# Patient Record
Sex: FEMALE | Race: WHITE | Employment: FULL TIME | URBAN - METROPOLITAN AREA
[De-identification: names, ages, dates, MRNs, and addresses within clinical notes are randomized per-mention and may not be internally consistent; named-entity substitution may affect disease eponyms.]

---

## 2020-05-05 ENCOUNTER — HOSPITAL ENCOUNTER (EMERGENCY)
Age: 20
Discharge: HOME | End: 2020-05-06
Attending: EMERGENCY MEDICINE
Payer: COMMERCIAL

## 2020-05-05 ENCOUNTER — APPOINTMENT (OUTPATIENT)
Dept: GENERAL RADIOLOGY | Age: 20
End: 2020-05-05
Attending: EMERGENCY MEDICINE
Payer: COMMERCIAL

## 2020-05-05 VITALS
HEART RATE: 93 BPM | TEMPERATURE: 98.1 F | DIASTOLIC BLOOD PRESSURE: 58 MMHG | HEIGHT: 65 IN | OXYGEN SATURATION: 98 % | RESPIRATION RATE: 16 BRPM | BODY MASS INDEX: 18.33 KG/M2 | SYSTOLIC BLOOD PRESSURE: 125 MMHG | WEIGHT: 110 LBS

## 2020-05-05 DIAGNOSIS — F12.10 MARIJUANA ABUSE: ICD-10-CM

## 2020-05-05 DIAGNOSIS — F10.920 ACUTE ALCOHOLIC INTOXICATION WITHOUT COMPLICATION (HCC): Primary | ICD-10-CM

## 2020-05-05 LAB
ALBUMIN SERPL-MCNC: 4 G/DL (ref 3.5–5)
ALBUMIN/GLOB SERPL: 1.1 {RATIO} (ref 1.1–2.2)
ALP SERPL-CCNC: 65 U/L (ref 45–117)
ALT SERPL-CCNC: 25 U/L (ref 12–78)
AMPHET UR QL SCN: NEGATIVE
ANION GAP SERPL CALC-SCNC: 11 MMOL/L (ref 5–15)
APPEARANCE UR: CLEAR
AST SERPL-CCNC: 16 U/L (ref 15–37)
BACTERIA URNS QL MICRO: NEGATIVE /HPF
BARBITURATES UR QL SCN: NEGATIVE
BASOPHILS # BLD: 0.1 K/UL (ref 0–0.1)
BASOPHILS NFR BLD: 1 % (ref 0–1)
BENZODIAZ UR QL: POSITIVE
BILIRUB SERPL-MCNC: 0.4 MG/DL (ref 0.2–1)
BILIRUB UR QL: NEGATIVE
BUN SERPL-MCNC: 6 MG/DL (ref 6–20)
BUN/CREAT SERPL: 9 (ref 12–20)
CALCIUM SERPL-MCNC: 8.8 MG/DL (ref 8.5–10.1)
CANNABINOIDS UR QL SCN: POSITIVE
CHLORIDE SERPL-SCNC: 105 MMOL/L (ref 97–108)
CO2 SERPL-SCNC: 21 MMOL/L (ref 21–32)
COCAINE UR QL SCN: NEGATIVE
COLOR UR: ABNORMAL
COMMENT, HOLDF: NORMAL
CREAT SERPL-MCNC: 0.7 MG/DL (ref 0.55–1.02)
DIFFERENTIAL METHOD BLD: ABNORMAL
DRUG SCRN COMMENT,DRGCM: ABNORMAL
EOSINOPHIL # BLD: 0 K/UL (ref 0–0.4)
EOSINOPHIL NFR BLD: 0 % (ref 0–7)
EPITH CASTS URNS QL MICRO: ABNORMAL /LPF
ERYTHROCYTE [DISTWIDTH] IN BLOOD BY AUTOMATED COUNT: 11.9 % (ref 11.5–14.5)
ETHANOL SERPL-MCNC: 209 MG/DL
GLOBULIN SER CALC-MCNC: 3.8 G/DL (ref 2–4)
GLUCOSE SERPL-MCNC: 89 MG/DL (ref 65–100)
GLUCOSE UR STRIP.AUTO-MCNC: NEGATIVE MG/DL
HCT VFR BLD AUTO: 45 % (ref 35–47)
HGB BLD-MCNC: 15.2 G/DL (ref 11.5–16)
HGB UR QL STRIP: ABNORMAL
IMM GRANULOCYTES # BLD AUTO: 0 K/UL (ref 0–0.04)
IMM GRANULOCYTES NFR BLD AUTO: 0 % (ref 0–0.5)
KETONES UR QL STRIP.AUTO: NEGATIVE MG/DL
LEUKOCYTE ESTERASE UR QL STRIP.AUTO: NEGATIVE
LYMPHOCYTES # BLD: 2.4 K/UL (ref 0.8–3.5)
LYMPHOCYTES NFR BLD: 34 % (ref 12–49)
MCH RBC QN AUTO: 30 PG (ref 26–34)
MCHC RBC AUTO-ENTMCNC: 33.8 G/DL (ref 30–36.5)
MCV RBC AUTO: 88.8 FL (ref 80–99)
METHADONE UR QL: NEGATIVE
MONOCYTES # BLD: 0.3 K/UL (ref 0–1)
MONOCYTES NFR BLD: 4 % (ref 5–13)
NEUTS SEG # BLD: 4.2 K/UL (ref 1.8–8)
NEUTS SEG NFR BLD: 61 % (ref 32–75)
NITRITE UR QL STRIP.AUTO: NEGATIVE
NRBC # BLD: 0 K/UL (ref 0–0.01)
NRBC BLD-RTO: 0 PER 100 WBC
OPIATES UR QL: NEGATIVE
PCP UR QL: NEGATIVE
PH UR STRIP: 6 [PH] (ref 5–8)
PLATELET # BLD AUTO: 331 K/UL (ref 150–400)
PMV BLD AUTO: 9.5 FL (ref 8.9–12.9)
POTASSIUM SERPL-SCNC: 3.6 MMOL/L (ref 3.5–5.1)
PROT SERPL-MCNC: 7.8 G/DL (ref 6.4–8.2)
PROT UR STRIP-MCNC: NEGATIVE MG/DL
RBC # BLD AUTO: 5.07 M/UL (ref 3.8–5.2)
RBC #/AREA URNS HPF: ABNORMAL /HPF (ref 0–5)
SAMPLES BEING HELD,HOLD: NORMAL
SODIUM SERPL-SCNC: 137 MMOL/L (ref 136–145)
SP GR UR REFRACTOMETRY: <1.005 (ref 1–1.03)
UA: UC IF INDICATED,UAUC: ABNORMAL
UROBILINOGEN UR QL STRIP.AUTO: 1 EU/DL (ref 0.2–1)
WBC # BLD AUTO: 7 K/UL (ref 3.6–11)
WBC URNS QL MICRO: ABNORMAL /HPF (ref 0–4)

## 2020-05-05 PROCEDURE — 80307 DRUG TEST PRSMV CHEM ANLYZR: CPT

## 2020-05-05 PROCEDURE — 85025 COMPLETE CBC W/AUTO DIFF WBC: CPT

## 2020-05-05 PROCEDURE — 99282 EMERGENCY DEPT VISIT SF MDM: CPT

## 2020-05-05 PROCEDURE — 81001 URINALYSIS AUTO W/SCOPE: CPT

## 2020-05-05 PROCEDURE — 80053 COMPREHEN METABOLIC PANEL: CPT

## 2020-05-05 RX ORDER — ONDANSETRON 2 MG/ML
8 INJECTION INTRAMUSCULAR; INTRAVENOUS
Status: DISCONTINUED | OUTPATIENT
Start: 2020-05-05 | End: 2020-05-06 | Stop reason: HOSPADM

## 2020-05-06 RX ORDER — ONDANSETRON 8 MG/1
8 TABLET, ORALLY DISINTEGRATING ORAL
Qty: 15 TAB | Refills: 0 | Status: SHIPPED | OUTPATIENT
Start: 2020-05-06

## 2020-05-06 NOTE — DISCHARGE INSTRUCTIONS
Patient Education        Acute Alcohol Intoxication: Care Instructions  Your Care Instructions    You have had treatment to help your body rid itself of alcohol. Too much alcohol upsets the body's fluid balance. Your doctor may have given you fluids and vitamins. For some people, drinking too much alcohol is a one-time event. For others, it is an ongoing problem. In either case, it is serious. It can be life-threatening. Follow-up care is a key part of your treatment and safety. Be sure to make and go to all appointments, and call your doctor if you are having problems. It's also a good idea to know your test results and keep a list of the medicines you take. How can you care for yourself at home? · Do not drink and drive. · Be safe with medicines. Take your medicines exactly as prescribed. Call your doctor if you think you are having a problem with your medicine. · Your doctor may have prescribed disulfiram (Antabuse). Do not drink any alcohol while you are taking this medicine. You may have severe or even life-threatening side effects from even small amounts of alcohol. · If you were given medicine to prevent nausea, be sure to take it exactly as prescribed. · Before you take any medicine, tell your doctor if:  ? You have had a bad reaction to any medicines in the past.  ? You are taking other medicines, including over-the-counter ones, or have other health problems. ? You are or could be pregnant. · Be prepared to have some symptoms of withdrawal in the next few days. · Drink plenty of liquids in the next few days. · Seek help if you need it to stop drinking. Getting counseling and joining a support group can help you stay sober. Try a support group such as Alcoholics Anonymous. · Avoid alcohol when you take medicines. It can react with many medicines and cause serious problems. When should you call for help? Call 911 anytime you think you may need emergency care.  For example, call if:    · You feel confused and are seeing things that are not there.     · You are thinking about killing yourself or hurting others.     · You have a seizure.     · You vomit blood or what looks like coffee grounds.    Call your doctor now or seek immediate medical care if:    · You have trembling, restlessness, sweating, and other withdrawal symptoms that are new or that get worse.     · Your withdrawal symptoms come back after not bothering you for days or weeks.     · You can't stop vomiting.    Watch closely for changes in your health, and be sure to contact your doctor if:    · You need help to stop drinking. Where can you learn more? Go to http://fareed-imelda.info/  Enter T102 in the search box to learn more about \"Acute Alcohol Intoxication: Care Instructions. \"  Current as of: August 21, 2019Content Version: 12.4  © 5119-5244 [employer], [employer]orporated. Care instructions adapted under license by [employer] (which disclaims liability or warranty for this information). If you have questions about a medical condition or this instruction, always ask your healthcare professional. [address] any warranty or liability for your use of this information.

## 2020-05-06 NOTE — ED PROVIDER NOTES
The patient is a 27-year-old female with a past medical history significant for anxiety and depression who presents to the ED with her mother for evaluation for altered mental status the patient. Has been taking Lexapro and Klonopin for her mental health disorders however, this evening, she drank alcohol and became increasingly slow and lethargic, stumbling around. Mother states that she perhaps was having an allergic reaction. The patient has a vague recollection of the event and appears slightly confused but intoxicated with a small shell of alcohol emanating from her breath. She denies any further discomfort at this time. No past medical history on file. No past surgical history on file. No family history on file.     Social History     Socioeconomic History    Marital status: SINGLE     Spouse name: Not on file    Number of children: Not on file    Years of education: Not on file    Highest education level: Not on file   Occupational History    Not on file   Social Needs    Financial resource strain: Not on file    Food insecurity     Worry: Not on file     Inability: Not on file    Transportation needs     Medical: Not on file     Non-medical: Not on file   Tobacco Use    Smoking status: Not on file   Substance and Sexual Activity    Alcohol use: Not on file    Drug use: Not on file    Sexual activity: Not on file   Lifestyle    Physical activity     Days per week: Not on file     Minutes per session: Not on file    Stress: Not on file   Relationships    Social connections     Talks on phone: Not on file     Gets together: Not on file     Attends Hoahaoism service: Not on file     Active member of club or organization: Not on file     Attends meetings of clubs or organizations: Not on file     Relationship status: Not on file    Intimate partner violence     Fear of current or ex partner: Not on file     Emotionally abused: Not on file     Physically abused: Not on file Forced sexual activity: Not on file   Other Topics Concern    Not on file   Social History Narrative    Not on file         ALLERGIES: Pcn [penicillins]    Review of Systems   Unable to perform ROS: Acuity of condition   All other systems reviewed and are negative. Vitals:    05/05/20 2150   BP: 125/58   Pulse: 93   Resp: 16   Temp: 98.1 °F (36.7 °C)   SpO2: 98%   Weight: 49.9 kg (110 lb)   Height: 5' 5\" (1.651 m)            Physical Exam  Vitals signs and nursing note reviewed. Exam conducted with a chaperone present. CONSTITUTIONAL: Well-appearing; well-nourished; in no apparent distress  HEAD: Normocephalic; atraumatic  EYES: PERRL; EOM intact; conjunctiva and sclera are clear bilaterally. ENT: No rhinorrhea; normal pharynx with no tonsillar hypertrophy; mucous membranes pink/moist, no erythema, no exudate. NECK: Supple; non-tender; no cervical lymphadenopathy  CARD: Normal S1, S2; no murmurs, rubs, or gallops. Regular rate and rhythm. RESP: Normal respiratory effort; breath sounds clear and equal bilaterally; no wheezes, rhonchi, or rales. ABD: Normal bowel sounds; non-distended; non-tender; no palpable organomegaly, no masses, no bruits. Back Exam: Normal inspection; no vertebral point tenderness, no CVA tenderness. Normal range of motion. EXT: Normal ROM in all four extremities; non-tender to palpation; no swelling or deformity; distal pulses are normal, no edema. SKIN: Warm; dry; no rash. NEURO:Alert and oriented x 3, coherent, RHODA-XII grossly intact, sensory and motor are non-focal.      MDM  Number of Diagnoses or Management Options  Diagnosis management comments: Assessment: Differential diagnosis include acute alcohol intoxication/rule out electrolyte abnormality, dehydration and substance abuse    Plan: Lab/IV fluid/antiemetic and analgesia/p.o. challenge/serial exam6/ Monitor and Reevaluate.          Amount and/or Complexity of Data Reviewed  Clinical lab tests: ordered and reviewed  Tests in the radiology section of CPT®: ordered and reviewed  Tests in the medicine section of CPT®: reviewed and ordered  Discussion of test results with the performing providers: yes  Decide to obtain previous medical records or to obtain history from someone other than the patient: yes  Obtain history from someone other than the patient: yes  Review and summarize past medical records: yes  Discuss the patient with other providers: yes  Independent visualization of images, tracings, or specimens: yes    Risk of Complications, Morbidity, and/or Mortality  Presenting problems: moderate  Diagnostic procedures: moderate  Management options: moderate           Procedures      Progress Note:   Pt has been reexamined by Leslie Salamanca MD. Pt is feeling much better. Symptoms have improved. All available results have been reviewed with pt and any available family. The patient was given p.o. challenge that she tolerated well. She denies any further discomfort. Patient's mother was contacted and discharge instruction were reviewed with her and the patient. pt understands sx, dx, and tx in ED. Care plan has been outlined and questions have been answered. Pt is ready to go home. Will send home on marijuana abuse and acute alcohol intoxication instruction. Prescription of Zofran. Oral rehydration instruction. . Outpatient referral with PCP as needed. Written by Leslie Salamanca MD,12:20 AM    .   .

## 2020-05-06 NOTE — ED TRIAGE NOTES
Triage: Per mother pt has started new prescription for xanax and also consumed ETOH this evening. Per pt, 3 drinks were consumed. Pt denies any suicidal ideation.

## 2020-06-29 ENCOUNTER — OFFICE VISIT (OUTPATIENT)
Dept: PRIMARY CARE CLINIC | Age: 20
End: 2020-06-29

## 2020-06-29 DIAGNOSIS — R19.7 DIARRHEA, UNSPECIFIED TYPE: Primary | ICD-10-CM

## 2020-06-29 NOTE — PROGRESS NOTES
Patient was seen in a Flu Clinic at [facility], [place], operated by [facility]. The patient verbally consented to being treated and billed for this visit    **  She is a 23 y.o. female who presents for evalution. Reviewed PmHx, RxHx, FmHx, SocHx, AllgHx and updated and dated in the chart. There are no active problems to display for this patient. Review of Systems - negative except as listed above in the HPI    Objective: There were no vitals filed for this visit. Physical Examination: General appearance - alert, well appearing, and in no distress    Assessment/ Plan:   Diagnoses and all orders for this visit:    1. Diarrhea, unspecified type  -     NOVEL CORONAVIRUS (COVID-19); Future  -see scanned sheet  -rec [facility] otc         Patient is presumed covid-19 positive based on criteria and testing performed and given mask today. See scanned note for history, vital details and testing results  There is no evidence of respiratory compromise  Discussed with patient to isolate for 14 days and gave [facility] handout on proper isolation   Gave work note if applicable  Discussed with patient to call 911 or go to ED with any inc respiratory sxs        I have discussed the diagnosis with the patient and the intended plan as seen in the above orders. The patient understands and agrees with the plan. The patient has received an after-visit summary and questions were answered concerning future plans. Medication Side Effects and Warnings were discussed with patient  Patient Labs were reviewed and or requested:  Patient Past Records were reviewed and or requested    Rodriguez Courtney M.D. There are no Patient Instructions on file for this visit.

## 2020-07-03 LAB — SARS-COV-2, NAA: NOT DETECTED

## 2020-11-11 NOTE — PROGRESS NOTES
HISTORY OF PRESENTING ILLNESS      Jenny Marin is a 23 y.o. female with lightheadedness upon standing who experienced lower extremity edema bilaterally several months ago that required compression socks. She has occasional palpitations. One episode of syncope in the past while walking. No family Hx of SCD. EKG shows SR with possible short [place] interval. She has been on her birth control for years without change in regimen; she started lexapro about 6 months ago however after her clinical symptoms reported above. PAST MEDICAL HISTORY     History reviewed. No pertinent past medical history. PAST SURGICAL HISTORY     History reviewed. No pertinent surgical history.        ALLERGIES     Allergies   Allergen Reactions    Pcn [Penicillins] Hives          FAMILY HISTORY     Family History   Problem Relation Age of Onset    Ulcerative Colitis Mother     Anemia Mother     Hypertension Father     Thyroid Disease Father     Hypertension Paternal Grandmother     Dementia Paternal Grandmother     Colon Cancer Paternal Grandfather     negative for cardiac disease       SOCIAL HISTORY     Social History     Socioeconomic History    Marital status: UNKNOWN     Spouse name: Not on file    Number of children: Not on file    Years of education: Not on file    Highest education level: Not on file   Tobacco Use    Smoking status: Current Every Day Smoker     Packs/day: 0.25     Years: 0.50     Pack years: 0.12    Smokeless tobacco: Never Used   Substance and Sexual Activity    Alcohol use: Yes     Comment: occ    Drug use: Not Currently     Types: Marijuana         MEDICATIONS     Current Outpatient Medications   Medication Sig    escitalopram oxalate (LEXAPRO) 20 mg tablet     norethindrone-ethinyl estradiol (Junel FE 1/20, 28,) 1 mg-20 mcg (21)/75 mg (7) tab Junel FE 1/20 (28) 1 mg-20 mcg (21)/75 mg (7) tablet    ondansetron (Zofran ODT) 8 mg disintegrating tablet Take 1 Tab by mouth every eight (8) hours as needed for Nausea or Vomiting. No current facility-administered medications for this visit. I have reviewed the nurses notes, vitals, problem list, allergy list, medical history, family, social history and medications. REVIEW OF SYMPTOMS      General: Pt denies excessive weight gain or loss. Pt is able to conduct ADL's  HEENT: Denies blurred vision, headaches, hearing loss, epistaxis and difficulty swallowing. Respiratory: Denies cough, congestion, shortness of breath, SRIVASTAVA, wheezing or stridor. Cardiovascular: Denies precordial pain, palpitations, edema or PND  Gastrointestinal: Denies poor appetite, indigestion, abdominal pain or blood in stool  Genitourinary: Denies hematuria, dysuria, increased urinary frequency  Musculoskeletal: Denies joint pain or swelling from muscles or joints  Neurologic: Denies tremor, paresthesias, headache, or sensory motor disturbance  Psychiatric: Denies confusion, insomnia, depression  Integumentray: Denies rash, itching or ulcers. Hematologic: Denies easy bruising, bleeding       PHYSICAL EXAMINATION      Vitals: see vitals section  General: Well developed, in no acute distress. HEENT: No jaundice, oral mucosa moist, no oral ulcers  Neck: Supple, no stiffness, no lymphadenopathy, supple  Heart:  Normal S1/S2 negative S3 or S4. Regular, no murmur, gallop or rub, no jugular venous distention  Respiratory: Clear bilaterally x 4, no wheezing or rales  Abdomen:   Soft, non-tender, bowel sounds are active. Extremities:  No edema, normal cap refill, no cyanosis. Musculoskeletal: No clubbing, no deformities  Neuro: A&Ox3, speech clear, gait stable, cooperative, no focal neurologic deficits  Skin: Skin color is normal. No rashes or lesions.  Non diaphoretic, moist.  Vascular: 2+ pulses symmetric in all extremities       DIAGNOSTIC DATA      EKG:        LABORATORY DATA      Lab Results   Component Value Date/Time    WBC 7.0 05/05/2020 09:56 PM    HGB 15.2 05/05/2020 09:56 PM    HCT 45.0 05/05/2020 09:56 PM    PLATELET 364 15/57/4933 09:56 PM    MCV 88.8 05/05/2020 09:56 PM      Lab Results   Component Value Date/Time    Sodium 137 05/05/2020 09:56 PM    Potassium 3.6 05/05/2020 09:56 PM    Chloride 105 05/05/2020 09:56 PM    CO2 21 05/05/2020 09:56 PM    Anion gap 11 05/05/2020 09:56 PM    Glucose 89 05/05/2020 09:56 PM    BUN 6 05/05/2020 09:56 PM    Creatinine 0.70 05/05/2020 09:56 PM    BUN/Creatinine ratio 9 (L) 05/05/2020 09:56 PM    GFR est AA >60 05/05/2020 09:56 PM    GFR est non-AA >60 05/05/2020 09:56 PM    Calcium 8.8 05/05/2020 09:56 PM    Bilirubin, total 0.4 05/05/2020 09:56 PM    Alk. phosphatase 65 05/05/2020 09:56 PM    Protein, total 7.8 05/05/2020 09:56 PM    Albumin 4.0 05/05/2020 09:56 PM    Globulin 3.8 05/05/2020 09:56 PM    A-G Ratio 1.1 05/05/2020 09:56 PM    ALT (SGPT) 25 05/05/2020 09:56 PM           ASSESSMENT      1. Lightheadedness  2. Palpitations  3. Edema       PLAN     Plan for echocardiogram, 30 day monitor, TSH, lower extremity ultrasound to evaluate for venous insufficiency. ICD-10-CM ICD-9-CM    1. Lightheadedness  R42 780.4 AMB POC EKG ROUTINE W/ 12 LEADS, INTER & REP      TSH 3RD GENERATION   2. Palpitations  R00.2 785.1      Orders Placed This Encounter    TSH 3RD GENERATION     Standing Status:   Future     Standing Expiration Date:   11/16/2021    AMB POC EKG ROUTINE W/ 12 LEADS, INTER & REP     Order Specific Question:   Reason for Exam:     Answer:   Postural orthostatic tachycardia syndrome, lightheaded    escitalopram oxalate (LEXAPRO) 20 mg tablet    norethindrone-ethinyl estradiol (Junel FE 1/20, 28,) 1 mg-20 mcg (21)/75 mg (7) tab     Sig: Junel FE 1/20 (28) 1 mg-20 mcg (21)/75 mg (7) tablet          FOLLOW-UP     After testing    Thank you, Cari Gonzalez MD for allowing me to participate in the care of this extraordinarily pleasant female.  Please do not hesitate to contact me for further questions/concerns.          Karmen Moreno MD  Cardiac Electrophysiology / Cardiology    [facility].  [address] [facility], [facility], [address], [address] [place] [address], [address] [place] [address]  [phone] / [phone] Fax   [phone] / [phone] Fax

## 2020-11-16 ENCOUNTER — OFFICE VISIT (OUTPATIENT)
Dept: CARDIOLOGY CLINIC | Age: 20
End: 2020-11-16
Payer: COMMERCIAL

## 2020-11-16 VITALS
BODY MASS INDEX: 18.46 KG/M2 | HEIGHT: 65 IN | HEART RATE: 68 BPM | OXYGEN SATURATION: 96 % | DIASTOLIC BLOOD PRESSURE: 72 MMHG | WEIGHT: 110.8 LBS | SYSTOLIC BLOOD PRESSURE: 108 MMHG

## 2020-11-16 DIAGNOSIS — R42 LIGHTHEADEDNESS: Primary | ICD-10-CM

## 2020-11-16 DIAGNOSIS — R00.2 PALPITATIONS: ICD-10-CM

## 2020-11-16 PROCEDURE — 99205 OFFICE O/P NEW HI 60 MIN: CPT | Performed by: INTERNAL MEDICINE

## 2020-11-16 PROCEDURE — 93000 ELECTROCARDIOGRAM COMPLETE: CPT | Performed by: INTERNAL MEDICINE

## 2020-11-16 RX ORDER — ESCITALOPRAM OXALATE 20 MG/1
20 TABLET ORAL DAILY
COMMUNITY
Start: 2020-11-12

## 2020-11-16 RX ORDER — NORETHINDRONE ACETATE AND ETHINYL ESTRADIOL 1MG-20(21)
KIT ORAL
COMMUNITY

## 2020-11-16 NOTE — PROGRESS NOTES
Room 4    Postural orthostatic tachycardia syndrome    Before covid started she was advised by Dr. Shelia Wolf she may have POTS    Has had swelling in legs x 6-7 months, has gone down since. In middle school, she would get lightheaded when standing. Now the lightheadedness is every once in a while.      Have you ever seen a electrophysiologist: First timer    Chest pain: no  Shortness of breath: no  Edema: no  Palpitations, Skipped beats, Rapid heartbeat: no  Dizziness: no    (Recent) New diagnosis/Surgeries: no    ER/Hospitalizations for your symptoms: no

## 2020-11-25 ENCOUNTER — ANCILLARY PROCEDURE (OUTPATIENT)
Dept: CARDIOLOGY CLINIC | Age: 20
End: 2020-11-25
Payer: COMMERCIAL

## 2020-11-25 VITALS
WEIGHT: 110 LBS | SYSTOLIC BLOOD PRESSURE: 106 MMHG | DIASTOLIC BLOOD PRESSURE: 72 MMHG | HEIGHT: 65 IN | BODY MASS INDEX: 18.33 KG/M2

## 2020-11-25 VITALS
BODY MASS INDEX: 18.33 KG/M2 | DIASTOLIC BLOOD PRESSURE: 72 MMHG | HEIGHT: 65 IN | SYSTOLIC BLOOD PRESSURE: 106 MMHG | WEIGHT: 110 LBS

## 2020-11-25 DIAGNOSIS — R00.2 PALPITATIONS: ICD-10-CM

## 2020-11-25 DIAGNOSIS — R60.0 BILATERAL LEG EDEMA: ICD-10-CM

## 2020-11-25 DIAGNOSIS — R42 LIGHTHEADED: ICD-10-CM

## 2020-11-25 DIAGNOSIS — R55 PRE-SYNCOPE: ICD-10-CM

## 2020-11-25 PROCEDURE — 93306 TTE W/DOPPLER COMPLETE: CPT | Performed by: SPECIALIST

## 2020-11-25 PROCEDURE — 93970 EXTREMITY STUDY: CPT | Performed by: INTERNAL MEDICINE

## 2020-11-29 LAB
LEFT CFV DIAM: 0.73 CM
LEFT CFV RFX: 1.8 S
LEFT GSV ANKLE DIAM: 0.32 CM
LEFT GSV AT KNEE DIAM: 0.29 CM
LEFT GSV BK MID DIAM: 0.35 CM
LEFT GSV BK PROX DIAM: 0.36 CM
LEFT GSV JUNC DIAM: 0.4 CM
LEFT GSV JUNC RFX: 1.3 S
LEFT GSV THIGH DIST DIAM: 0.38 CM
LEFT GSV THIGH MID DIAM: 0.37 CM
LEFT GSV THIGH PROX DIAM: 0.4 CM
Lab: 0.3 S
RIGHT GSV AK RFX: 0.4 S
RIGHT GSV BK MID RFX: 0.3 S
RIGHT GSV BK PROX RFX: 0.4 S
RIGHT GSV JUNC RFX: 0.6 S
RIGHT GSV THIGH DIST RFX: 0.4 S
RIGHT GSV THIGH MID RFX: 0.4 S
RIGHT GSV THIGH PROX RFX: 0.3 S

## 2020-12-02 ENCOUNTER — TELEPHONE (OUTPATIENT)
Dept: CARDIOLOGY CLINIC | Age: 20
End: 2020-12-02

## 2020-12-02 DIAGNOSIS — I87.2 VENOUS INSUFFICIENCY: Primary | ICD-10-CM

## 2020-12-02 LAB
ECHO AO ASC DIAM: 2.24 CM
ECHO AO ROOT DIAM: 2.41 CM
ECHO AV AREA PEAK VELOCITY: 2.21 CM2
ECHO AV AREA VTI: 1.9 CM2
ECHO AV AREA/BSA PEAK VELOCITY: 1.4 CM2/M2
ECHO AV AREA/BSA VTI: 1.2 CM2/M2
ECHO AV MEAN GRADIENT: 2.97 MMHG
ECHO AV PEAK GRADIENT: 5.3 MMHG
ECHO AV PEAK VELOCITY: 115.13 CM/S
ECHO AV VTI: 24.71 CM
ECHO LA AREA 4C: 12.11 CM2
ECHO LA MAJOR AXIS: 2.9 CM
ECHO LA MINOR AXIS: 1.89 CM
ECHO LA VOL 2C: 27.27 ML (ref 22–52)
ECHO LA VOL 4C: 26.01 ML (ref 22–52)
ECHO LA VOL BP: 30.28 ML (ref 22–52)
ECHO LA VOL/BSA BIPLANE: 19.74 ML/M2 (ref 16–28)
ECHO LA VOLUME INDEX A2C: 17.77 ML/M2 (ref 16–28)
ECHO LA VOLUME INDEX A4C: 16.95 ML/M2 (ref 16–28)
ECHO LV E' LATERAL VELOCITY: 18.02 CM/S
ECHO LV E' SEPTAL VELOCITY: 10.68 CM/S
ECHO LV EDV A2C: 74.74 ML
ECHO LV EDV A4C: 82.3 ML
ECHO LV EDV BP: 82.09 ML (ref 56–104)
ECHO LV EDV INDEX A4C: 53.6 ML/M2
ECHO LV EDV INDEX BP: 53.5 ML/M2
ECHO LV EDV NDEX A2C: 48.7 ML/M2
ECHO LV EJECTION FRACTION A2C: 62 PERCENT
ECHO LV EJECTION FRACTION A4C: 66 PERCENT
ECHO LV EJECTION FRACTION BIPLANE: 64.7 PERCENT (ref 55–100)
ECHO LV ESV A2C: 28.16 ML
ECHO LV ESV A4C: 28.27 ML
ECHO LV ESV BP: 29.02 ML (ref 19–49)
ECHO LV ESV INDEX A2C: 18.4 ML/M2
ECHO LV ESV INDEX A4C: 18.4 ML/M2
ECHO LV ESV INDEX BP: 18.9 ML/M2
ECHO LV GLOBAL LONGITUDINAL STRAIN (GLS): -22.4 PERCENT
ECHO LV INTERNAL DIMENSION DIASTOLIC: 4.37 CM (ref 3.9–5.3)
ECHO LV INTERNAL DIMENSION SYSTOLIC: 2.63 CM
ECHO LV IVSD: 0.61 CM (ref 0.6–0.9)
ECHO LV MASS 2D: 91.8 G (ref 67–162)
ECHO LV MASS INDEX 2D: 59.8 G/M2 (ref 43–95)
ECHO LV POSTERIOR WALL DIASTOLIC: 0.8 CM (ref 0.6–0.9)
ECHO LVOT DIAM: 1.85 CM
ECHO LVOT PEAK GRADIENT: 3.62 MMHG
ECHO LVOT PEAK VELOCITY: 95.18 CM/S
ECHO LVOT SV: 47 ML
ECHO LVOT VTI: 17.57 CM
ECHO MV A VELOCITY: 32.34 CM/S
ECHO MV E DECELERATION TIME (DT): 119.45 MS
ECHO MV E VELOCITY: 96.81 CM/S
ECHO MV E/A RATIO: 2.99
ECHO MV E/E' LATERAL: 5.37
ECHO MV E/E' RATIO (AVERAGED): 7.22
ECHO MV E/E' SEPTAL: 9.06
ECHO MV PRESSURE HALF TIME (PHT): 34.64 MS
ECHO RA AREA 4C: 7.73 CM2
ECHO RV INTERNAL DIMENSION: 2.31 CM
ECHO RV TAPSE: 1.75 CM (ref 1.5–2)
ECHO TV REGURGITANT MAX VELOCITY: 202.56 CM/S
ECHO TV REGURGITANT PEAK GRADIENT: 16.41 MMHG
GLOBAL LONGITUDINAL STRAIN 2 CHAMBER: -24.3 PERCENT
GLOBAL LONGITUDINAL STRAIN 4 CHAMBER: -20.3 PERCENT
GLOBAL LONGITUDINAL STRAIN LONG AXIS: -22.5 PERCENT
LA VOL DISK BP: 28.14 ML (ref 22–52)

## 2020-12-02 NOTE — TELEPHONE ENCOUNTER
----- Message from Huyen Owens NP sent at 12/2/2020  4:29 PM EST -----  Please let her know she has some venous insufficiency and we'd like to refer her to Dr. Sheri Sam for evaluation, thank you. Keep f/u appt with me. Called patient, no answer. VM not set up unable to leave a message.

## 2020-12-02 NOTE — PROGRESS NOTES
Please let her know she has some venous insufficiency and we'd like to refer her to Dr. Cy Weir for evaluation, thank you. Keep f/u appt with me.

## 2020-12-28 NOTE — PROGRESS NOTES
HISTORY OF PRESENTING ILLNESS      Jenny Marin is a 23 y.o. female with lightheadedness upon standing who experienced lower extremity edema bilaterally several months ago that required compression socks. She has occasional palpitations. One episode of syncope in the past while walking. No family Hx of SCD. EKG shows SR with possible short [place] interval. She has been on her birth control for years without change in regimen; she started lexapro about 6 months ago however after her clinical symptoms reported above. Echocardiogram showed preserved LV function with EF 60-65%. 30 day monitor showed normal sinus rhythm throughout. Her lower extremity ultrasound showed venous insufficiency. She wears compression stockings occasionally at work. Overall, she denies recurrence of symptoms since last visit. PAST MEDICAL HISTORY     History reviewed. No pertinent past medical history. PAST SURGICAL HISTORY     History reviewed. No pertinent surgical history.        ALLERGIES     Allergies   Allergen Reactions    Pcn [Penicillins] Hives          FAMILY HISTORY     Family History   Problem Relation Age of Onset    Ulcerative Colitis Mother     Anemia Mother     Hypertension Father     Thyroid Disease Father     Hypertension Paternal Grandmother     Dementia Paternal Grandmother     Colon Cancer Paternal Grandfather     negative for cardiac disease       SOCIAL HISTORY     Social History     Socioeconomic History    Marital status: UNKNOWN     Spouse name: Not on file    Number of children: Not on file    Years of education: Not on file    Highest education level: Not on file   Tobacco Use    Smoking status: Current Every Day Smoker     Packs/day: 0.25     Years: 0.50     Pack years: 0.12    Smokeless tobacco: Never Used   Substance and Sexual Activity    Alcohol use: Yes     Comment: occ    Drug use: Not Currently     Types: Marijuana         MEDICATIONS     Current Outpatient Medications   Medication Sig    escitalopram oxalate (LEXAPRO) 20 mg tablet     norethindrone-ethinyl estradiol (Junel FE 1/20, 28,) 1 mg-20 mcg (21)/75 mg (7) tab Junel FE 1/20 (28) 1 mg-20 mcg (21)/75 mg (7) tablet    ondansetron (Zofran ODT) 8 mg disintegrating tablet Take 1 Tab by mouth every eight (8) hours as needed for Nausea or Vomiting. No current facility-administered medications for this visit. I have reviewed the nurses notes, vitals, problem list, allergy list, medical history, family, social history and medications. REVIEW OF SYMPTOMS      General: Pt denies excessive weight gain or loss. Pt is able to conduct ADL's  HEENT: Denies blurred vision, headaches, hearing loss, epistaxis and difficulty swallowing. Respiratory: Denies cough, congestion, shortness of breath, SRIVASTAVA, wheezing or stridor. Cardiovascular: Denies precordial pain, palpitations, edema or PND  Gastrointestinal: Denies poor appetite, indigestion, abdominal pain or blood in stool  Genitourinary: Denies hematuria, dysuria, increased urinary frequency  Musculoskeletal: Denies joint pain or swelling from muscles or joints  Neurologic: Denies tremor, paresthesias, headache, or sensory motor disturbance  Psychiatric: Denies confusion, insomnia, depression  Integumentray: Denies rash, itching or ulcers. Hematologic: Denies easy bruising, bleeding       PHYSICAL EXAMINATION      Vitals: see vitals section  General: Well developed, in no acute distress. HEENT: No jaundice, oral mucosa moist, no oral ulcers  Neck: Supple, no stiffness, no lymphadenopathy, supple  Heart:  Normal S1/S2 negative S3 or S4. Regular, no murmur, gallop or rub, no jugular venous distention  Respiratory: Clear bilaterally x 4, no wheezing or rales  Abdomen:   Soft, non-tender, bowel sounds are active. Extremities:  No edema, normal cap refill, no cyanosis.   Musculoskeletal: No clubbing, no deformities  Neuro: A&Ox3, speech clear, gait stable, cooperative, no focal neurologic deficits  Skin: Skin color is normal. No rashes or lesions. Non diaphoretic, moist.  Vascular: 2+ pulses symmetric in all extremities       DIAGNOSTIC DATA      EKG:        LABORATORY DATA      Lab Results   Component Value Date/Time    WBC 7.0 05/05/2020 09:56 PM    HGB 15.2 05/05/2020 09:56 PM    HCT 45.0 05/05/2020 09:56 PM    PLATELET 129 57/34/1000 09:56 PM    MCV 88.8 05/05/2020 09:56 PM      Lab Results   Component Value Date/Time    Sodium 137 05/05/2020 09:56 PM    Potassium 3.6 05/05/2020 09:56 PM    Chloride 105 05/05/2020 09:56 PM    CO2 21 05/05/2020 09:56 PM    Anion gap 11 05/05/2020 09:56 PM    Glucose 89 05/05/2020 09:56 PM    BUN 6 05/05/2020 09:56 PM    Creatinine 0.70 05/05/2020 09:56 PM    BUN/Creatinine ratio 9 (L) 05/05/2020 09:56 PM    GFR est AA >60 05/05/2020 09:56 PM    GFR est non-AA >60 05/05/2020 09:56 PM    Calcium 8.8 05/05/2020 09:56 PM    Bilirubin, total 0.4 05/05/2020 09:56 PM    Alk. phosphatase 65 05/05/2020 09:56 PM    Protein, total 7.8 05/05/2020 09:56 PM    Albumin 4.0 05/05/2020 09:56 PM    Globulin 3.8 05/05/2020 09:56 PM    A-G Ratio 1.1 05/05/2020 09:56 PM    ALT (SGPT) 25 05/05/2020 09:56 PM           ASSESSMENT      1. Lightheadedness  2. Palpitations  3. Edema      PLAN     Encouraged increased use of compression stockings, lower extremity exercises. Continue to monitor for recurrence of symptoms, consider longer term monitoring for recurrent syncope/lightheadedness. Offered referral for venous insufficiency which she has declined at this time. ICD-10-CM ICD-9-CM    1. Palpitations  R00.2 785.1    2. Pre-syncope  R55 780.2    3. Bilateral leg edema  R60.0 782.3    4. Lightheadedness  R42 780.4      No orders of the defined types were placed in this encounter. FOLLOW-UP   1 year    Thank you, Irene Rivera MD for allowing me to participate in the care of this extraordinarily pleasant female.  Please do not hesitate to contact me for further questions/concerns.      L[place]man [place], NP    [facility].  [address] [facility], [facility], [address]    [facility]  [phone] / [phone] Fax   [phone] / [phone] Fax

## 2020-12-29 ENCOUNTER — OFFICE VISIT (OUTPATIENT)
Dept: CARDIOLOGY CLINIC | Age: 20
End: 2020-12-29
Payer: COMMERCIAL

## 2020-12-29 VITALS
SYSTOLIC BLOOD PRESSURE: 106 MMHG | DIASTOLIC BLOOD PRESSURE: 66 MMHG | HEART RATE: 80 BPM | WEIGHT: 115 LBS | HEIGHT: 65 IN | BODY MASS INDEX: 19.16 KG/M2 | OXYGEN SATURATION: 98 %

## 2020-12-29 DIAGNOSIS — R00.2 PALPITATIONS: Primary | ICD-10-CM

## 2020-12-29 DIAGNOSIS — R60.0 BILATERAL LEG EDEMA: ICD-10-CM

## 2020-12-29 DIAGNOSIS — R55 PRE-SYNCOPE: ICD-10-CM

## 2020-12-29 DIAGNOSIS — R42 LIGHTHEADEDNESS: ICD-10-CM

## 2020-12-29 PROCEDURE — 99215 OFFICE O/P EST HI 40 MIN: CPT | Performed by: NURSE PRACTITIONER

## 2020-12-29 NOTE — LETTER
12/29/2020 Patient: Shruthi Romano YOB: 2000 Date of Visit: 12/29/2020 Nataliya Humphrey MD 
[address] [facility] [address] [place] [address] [phone] Via Fax: [phone] Dear Nataliya Humphrey MD, Thank you for referring Ms. Jenny Marin to [facility] for evaluation. My notes for this consultation are attached. If you have questions, please do not hesitate to call me. I look forward to following your patient along with you. Sincerely, Chris Pulse, NP

## 2020-12-29 NOTE — PROGRESS NOTES
Room 4    Visit Vitals  /66   Pulse 80   Ht 5' 5\" (1.651 m)   Wt 115 lb (52.2 kg)   SpO2 98%   BMI 19.14 kg/m²       US, Echo and monitor (11-26 to 12-26) review and discuss all    Chest pain: no  Shortness of breath: no  Edema: no  Palpitations, Skipped beats, Rapid heartbeat: no  Dizziness: no    New diagnosis/Surgeries: no    ER/Hospitalizations: no    Refills: no

## 2020-12-29 NOTE — TELEPHONE ENCOUNTER
Future Appointments   Date Time Provider Anika Childers   12/29/2020 11:40 AM Enrique Parikh NP [facility]

## 2021-02-19 ENCOUNTER — TELEPHONE (OUTPATIENT)
Dept: CARDIOLOGY CLINIC | Age: 21
End: 2021-02-19

## 2021-02-19 NOTE — LETTER
2/19/2021 [employer] [address] [facility] [id number] Dear Ms. Marin, We received a referral from Dr. Felipe Singletary requesting that you be scheduled for an appointment with Dr. Dulce Cervantes. We have been unable to contact you via the telephone number provided in chart. Please call our office at [phone] to schedule an appointment. Sincerely, Clovis Roque RN

## 2021-02-19 NOTE — TELEPHONE ENCOUNTER
Have attempted to contact patient for appointment with Dr. Jacinto Osullivan. Mailbox not set up. Patient referred by Dr. Tahmina Layne NP. Letter mailed.

## 2022-01-10 ENCOUNTER — OFFICE VISIT (OUTPATIENT)
Dept: CARDIOLOGY CLINIC | Age: 22
End: 2022-01-10
Payer: COMMERCIAL

## 2022-01-10 VITALS
RESPIRATION RATE: 14 BRPM | HEART RATE: 64 BPM | DIASTOLIC BLOOD PRESSURE: 66 MMHG | SYSTOLIC BLOOD PRESSURE: 100 MMHG | BODY MASS INDEX: 19.53 KG/M2 | OXYGEN SATURATION: 96 % | WEIGHT: 117.2 LBS | HEIGHT: 65 IN

## 2022-01-10 DIAGNOSIS — R00.2 PALPITATIONS: Primary | ICD-10-CM

## 2022-01-10 DIAGNOSIS — R55 PRE-SYNCOPE: ICD-10-CM

## 2022-01-10 PROCEDURE — 99214 OFFICE O/P EST MOD 30 MIN: CPT | Performed by: NURSE PRACTITIONER

## 2022-01-10 PROCEDURE — 93000 ELECTROCARDIOGRAM COMPLETE: CPT | Performed by: NURSE PRACTITIONER

## 2022-01-10 NOTE — PROGRESS NOTES
HISTORY OF PRESENTING ILLNESS      Jenny Marin is a 19 y.o. female with lightheadedness upon standing who experienced lower extremity edema bilaterally several months ago that required compression socks. She has occasional palpitations. One episode of syncope in the past while walking. No family Hx of SCD. EKG shows SR with possible short NE interval. She has been on her birth control for years without change in regimen; she started lexapro about 6 months ago however after her clinical symptoms reported above. Echocardiogram showed preserved LV function with EF 60-65%. 30 day monitor showed normal sinus rhythm throughout. Her lower extremity ultrasound showed venous insufficiency. She wears compression stockings occasionally at work and last visit, encouraged increased use of compression stockings, lower extremity exercises. She has had episodes of palpitations after prolonged standing, usually on days she is reportedly less hydrated. PAST MEDICAL HISTORY     No past medical history on file. PAST SURGICAL HISTORY     No past surgical history on file.        ALLERGIES     Allergies   Allergen Reactions    Pcn [Penicillins] Hives          FAMILY HISTORY     Family History   Problem Relation Age of Onset    Ulcerative Colitis Mother     Anemia Mother     Hypertension Father     Thyroid Disease Father     Hypertension Paternal Grandmother     Dementia Paternal Grandmother     Colon Cancer Paternal Grandfather     negative for cardiac disease       SOCIAL HISTORY     Social History     Socioeconomic History    Marital status: UNKNOWN   Tobacco Use    Smoking status: Current Every Day Smoker     Packs/day: 0.25     Years: 0.50     Pack years: 0.12    Smokeless tobacco: Never Used   Substance and Sexual Activity    Alcohol use: Yes     Comment: occ    Drug use: Not Currently     Types: Marijuana         MEDICATIONS     Current Outpatient Medications   Medication Sig    escitalopram oxalate (LEXAPRO) 20 mg tablet     norethindrone-ethinyl estradiol (Junel FE 1/20, 28,) 1 mg-20 mcg (21)/75 mg (7) tab Junel FE 1/20 (28) 1 mg-20 mcg (21)/75 mg (7) tablet    ondansetron (Zofran ODT) 8 mg disintegrating tablet Take 1 Tab by mouth every eight (8) hours as needed for Nausea or Vomiting. No current facility-administered medications for this visit. I have reviewed the nurses notes, vitals, problem list, allergy list, medical history, family, social history and medications. REVIEW OF SYMPTOMS      General:+lightheadedness, Pt denies excessive weight gain or loss. Pt is able to conduct ADL's  HEENT: Denies blurred vision, headaches, hearing loss, epistaxis and difficulty swallowing. Respiratory: Denies cough, congestion, shortness of breath, SRIVASTAVA, wheezing or stridor. Cardiovascular: +palpitations, Denies precordial pain, edema or PND  Gastrointestinal: Denies poor appetite, indigestion, abdominal pain or blood in stool  Genitourinary: Denies hematuria, dysuria, increased urinary frequency  Musculoskeletal: Denies joint pain or swelling from muscles or joints  Neurologic: Denies tremor, paresthesias, headache, or sensory motor disturbance  Psychiatric: Denies confusion, insomnia, depression  Integumentray: Denies rash, itching or ulcers. Hematologic: Denies easy bruising, bleeding       PHYSICAL EXAMINATION      Vitals: see vitals section  General: Well developed, in no acute distress. HEENT: No jaundice, oral mucosa moist, no oral ulcers  Neck: Supple, no stiffness, no lymphadenopathy, supple  Heart:  Normal S1/S2 negative S3 or S4. Regular, no murmur, gallop or rub, no jugular venous distention  Respiratory: Clear bilaterally x 4, no wheezing or rales  Abdomen:   Soft, non-tender, bowel sounds are active. Extremities:  No edema, normal cap refill, no cyanosis.   Musculoskeletal: No clubbing, no deformities  Neuro: A&Ox3, speech clear, gait stable, cooperative, no focal neurologic deficits  Skin: Skin color is normal. No rashes or lesions. Non diaphoretic, moist.  Vascular: 2+ pulses symmetric in all extremities       DIAGNOSTIC DATA      EKG: sinus rhythm with short WI       LABORATORY DATA      Lab Results   Component Value Date/Time    WBC 7.0 05/05/2020 09:56 PM    HGB 15.2 05/05/2020 09:56 PM    HCT 45.0 05/05/2020 09:56 PM    PLATELET 506 28/38/8117 09:56 PM    MCV 88.8 05/05/2020 09:56 PM      Lab Results   Component Value Date/Time    Sodium 137 05/05/2020 09:56 PM    Potassium 3.6 05/05/2020 09:56 PM    Chloride 105 05/05/2020 09:56 PM    CO2 21 05/05/2020 09:56 PM    Anion gap 11 05/05/2020 09:56 PM    Glucose 89 05/05/2020 09:56 PM    BUN 6 05/05/2020 09:56 PM    Creatinine 0.70 05/05/2020 09:56 PM    BUN/Creatinine ratio 9 (L) 05/05/2020 09:56 PM    GFR est AA >60 05/05/2020 09:56 PM    GFR est non-AA >60 05/05/2020 09:56 PM    Calcium 8.8 05/05/2020 09:56 PM    Bilirubin, total 0.4 05/05/2020 09:56 PM    Alk. phosphatase 65 05/05/2020 09:56 PM    Protein, total 7.8 05/05/2020 09:56 PM    Albumin 4.0 05/05/2020 09:56 PM    Globulin 3.8 05/05/2020 09:56 PM    A-G Ratio 1.1 05/05/2020 09:56 PM    ALT (SGPT) 25 05/05/2020 09:56 PM           ASSESSMENT      1. Lightheadedness  2. Palpitations  3. Edema      PLAN     Continue to monitor for recurrence of symptoms, consider longer term monitoring for recurrent syncope/lightheadedness. Encouraged increased hydration for palpitations, continued compliance with compression socks for lightheadedness and edema. FOLLOW-UP   As needed    Thank you, Isha Soares MD for allowing me to participate in the care of this extraordinarily pleasant female. Please do not hesitate to contact me for further questions/concerns.      Carlos Eduardo Pickler, NP    [facility] [facility] [address].  [address] [place] [address], [address] [place] [address], [address] [place] [address] [address] [facility] [address], [place]  [phone] / [phone] Fax   [phone] / [phone] Fax

## 2022-01-10 NOTE — PROGRESS NOTES
Room EP 2  Visit Vitals  /66 (BP 1 Location: Left upper arm, BP Patient Position: Sitting)   Pulse 64   Resp 14   Ht 5' 5\" (1.651 m)   Wt 117 lb 3.2 oz (53.2 kg)   SpO2 96%   BMI 19.50 kg/m²       Chest pain: no  Shortness of breath: no  Edema: no  Palpitations, Skipped beats, Rapid heartbeat: \"flutters\" sometimes, randomly occurs randomly arms and legs start shaking  Dizziness: no  Fatigue:yes     New diagnosis/Surgeries: no    ER/Hospitalizations: no    Refills:no

## 2022-01-10 NOTE — LETTER
1/10/2022    Patient: King Poep   YOB: 2000   Date of Visit: 1/10/2022     Karilyn Sandhoff, MD  [address] [facility]  [address] [place] [address]  Via Fax: [phone]    Dear Karilyn Sandhoff, MD,      Thank you for referring Ms. Jenny Marin to [facility] for evaluation. My notes for this consultation are attached. If you have questions, please do not hesitate to call me. I look forward to following your patient along with you.       Sincerely,    Tommy Singh NP

## 2025-09-04 ENCOUNTER — HOSPITAL ENCOUNTER (EMERGENCY)
Facility: HOSPITAL | Age: 25
Discharge: HOME | End: 2025-09-04
Attending: EMERGENCY MEDICINE
Payer: COMMERCIAL

## 2025-09-04 VITALS
DIASTOLIC BLOOD PRESSURE: 70 MMHG | WEIGHT: 124.78 LBS | SYSTOLIC BLOOD PRESSURE: 110 MMHG | HEART RATE: 88 BPM | BODY MASS INDEX: 20.79 KG/M2 | RESPIRATION RATE: 18 BRPM | HEIGHT: 65 IN | OXYGEN SATURATION: 93 % | TEMPERATURE: 98.2 F

## 2025-09-04 DIAGNOSIS — R07.9 CHEST PAIN, UNSPECIFIED TYPE: Primary | ICD-10-CM

## 2025-09-04 LAB
ALBUMIN SERPL-MCNC: 3.1 G/DL (ref 3.5–5.2)
ALBUMIN/GLOB SERPL: 0.9 (ref 1.1–2.2)
ALP SERPL-CCNC: 134 U/L (ref 35–104)
ALT SERPL-CCNC: 21 U/L (ref 10–35)
ANION GAP SERPL CALC-SCNC: 11 MMOL/L (ref 2–14)
AST SERPL-CCNC: ABNORMAL U/L (ref 10–35)
BASOPHILS # BLD: 0.04 K/UL (ref 0–0.1)
BASOPHILS NFR BLD: 0.6 % (ref 0–1)
BILIRUB SERPL-MCNC: 0.4 MG/DL (ref 0–1.2)
BUN SERPL-MCNC: 6 MG/DL (ref 6–20)
BUN/CREAT SERPL: 13 (ref 12–20)
CALCIUM SERPL-MCNC: 8.8 MG/DL (ref 8.6–10)
CHLORIDE SERPL-SCNC: 101 MMOL/L (ref 98–107)
CO2 SERPL-SCNC: 22 MMOL/L (ref 20–29)
CREAT SERPL-MCNC: 0.43 MG/DL (ref 0.6–1)
D DIMER PPP FEU-MCNC: 0.5 MG/L FEU (ref 0–0.65)
DIFFERENTIAL METHOD BLD: NORMAL
EOSINOPHIL # BLD: 0.03 K/UL (ref 0–0.4)
EOSINOPHIL NFR BLD: 0.4 % (ref 0–7)
ERYTHROCYTE [DISTWIDTH] IN BLOOD BY AUTOMATED COUNT: 12.5 % (ref 11.5–14.5)
GLOBULIN SER CALC-MCNC: 3.6 G/DL (ref 2–4)
GLUCOSE SERPL-MCNC: 80 MG/DL (ref 65–100)
HCT VFR BLD AUTO: 36.9 % (ref 35–47)
HGB BLD-MCNC: 13 G/DL (ref 11.5–16)
IMM GRANULOCYTES # BLD AUTO: 0.02 K/UL (ref 0–0.04)
IMM GRANULOCYTES NFR BLD AUTO: 0.3 % (ref 0–0.5)
LYMPHOCYTES # BLD: 1.49 K/UL (ref 0.8–3.5)
LYMPHOCYTES NFR BLD: 21.3 % (ref 12–49)
MCH RBC QN AUTO: 30.2 PG (ref 26–34)
MCHC RBC AUTO-ENTMCNC: 35.2 G/DL (ref 30–36.5)
MCV RBC AUTO: 85.8 FL (ref 80–99)
MONOCYTES # BLD: 0.4 K/UL (ref 0–1)
MONOCYTES NFR BLD: 5.7 % (ref 5–13)
NEUTS SEG # BLD: 5.03 K/UL (ref 1.8–8)
NEUTS SEG NFR BLD: 71.7 % (ref 32–75)
NRBC # BLD: 0 K/UL (ref 0–0.01)
NRBC BLD-RTO: 0 PER 100 WBC
PLATELET # BLD AUTO: 231 K/UL (ref 150–400)
PMV BLD AUTO: 10.2 FL (ref 8.9–12.9)
POTASSIUM SERPL-SCNC: ABNORMAL MMOL/L (ref 3.5–5.1)
PROT SERPL-MCNC: 6.7 G/DL (ref 6.4–8.3)
RBC # BLD AUTO: 4.3 M/UL (ref 3.8–5.2)
SODIUM SERPL-SCNC: 134 MMOL/L (ref 136–145)
TROPONIN T SERPL HS-MCNC: <6 NG/L (ref 0–14)
WBC # BLD AUTO: 7 K/UL (ref 3.6–11)

## 2025-09-04 PROCEDURE — 85379 FIBRIN DEGRADATION QUANT: CPT

## 2025-09-04 PROCEDURE — 36415 COLL VENOUS BLD VENIPUNCTURE: CPT

## 2025-09-04 PROCEDURE — 84484 ASSAY OF TROPONIN QUANT: CPT

## 2025-09-04 PROCEDURE — 80053 COMPREHEN METABOLIC PANEL: CPT

## 2025-09-04 PROCEDURE — 85025 COMPLETE CBC W/AUTO DIFF WBC: CPT

## 2025-09-04 RX ORDER — ALBUTEROL SULFATE 90 UG/1
2 INHALANT RESPIRATORY (INHALATION) 4 TIMES DAILY PRN
Qty: 18 G | Refills: 0 | Status: SHIPPED | OUTPATIENT
Start: 2025-09-04

## 2025-09-04 ASSESSMENT — PAIN - FUNCTIONAL ASSESSMENT: PAIN_FUNCTIONAL_ASSESSMENT: 0-10

## 2025-09-04 ASSESSMENT — PAIN SCALES - GENERAL: PAINLEVEL_OUTOF10: 0
